# Patient Record
Sex: MALE | Race: WHITE
[De-identification: names, ages, dates, MRNs, and addresses within clinical notes are randomized per-mention and may not be internally consistent; named-entity substitution may affect disease eponyms.]

---

## 2018-09-26 ENCOUNTER — HOSPITAL ENCOUNTER (OUTPATIENT)
Dept: HOSPITAL 72 - SUR | Age: 33
Discharge: HOME | End: 2018-09-26
Payer: COMMERCIAL

## 2018-09-26 VITALS — SYSTOLIC BLOOD PRESSURE: 113 MMHG | DIASTOLIC BLOOD PRESSURE: 73 MMHG

## 2018-09-26 VITALS — SYSTOLIC BLOOD PRESSURE: 120 MMHG | DIASTOLIC BLOOD PRESSURE: 77 MMHG

## 2018-09-26 VITALS — DIASTOLIC BLOOD PRESSURE: 72 MMHG | SYSTOLIC BLOOD PRESSURE: 122 MMHG

## 2018-09-26 VITALS — SYSTOLIC BLOOD PRESSURE: 116 MMHG | DIASTOLIC BLOOD PRESSURE: 73 MMHG

## 2018-09-26 VITALS — SYSTOLIC BLOOD PRESSURE: 114 MMHG | DIASTOLIC BLOOD PRESSURE: 77 MMHG

## 2018-09-26 VITALS — DIASTOLIC BLOOD PRESSURE: 81 MMHG | SYSTOLIC BLOOD PRESSURE: 134 MMHG

## 2018-09-26 VITALS — SYSTOLIC BLOOD PRESSURE: 133 MMHG | DIASTOLIC BLOOD PRESSURE: 80 MMHG

## 2018-09-26 VITALS — SYSTOLIC BLOOD PRESSURE: 117 MMHG | DIASTOLIC BLOOD PRESSURE: 78 MMHG

## 2018-09-26 VITALS — BODY MASS INDEX: 26.6 KG/M2 | WEIGHT: 190 LBS | HEIGHT: 71 IN

## 2018-09-26 VITALS — SYSTOLIC BLOOD PRESSURE: 124 MMHG | DIASTOLIC BLOOD PRESSURE: 56 MMHG

## 2018-09-26 VITALS — SYSTOLIC BLOOD PRESSURE: 112 MMHG | DIASTOLIC BLOOD PRESSURE: 75 MMHG

## 2018-09-26 VITALS — SYSTOLIC BLOOD PRESSURE: 120 MMHG | DIASTOLIC BLOOD PRESSURE: 79 MMHG

## 2018-09-26 VITALS — DIASTOLIC BLOOD PRESSURE: 71 MMHG | SYSTOLIC BLOOD PRESSURE: 123 MMHG

## 2018-09-26 VITALS — SYSTOLIC BLOOD PRESSURE: 120 MMHG | DIASTOLIC BLOOD PRESSURE: 76 MMHG

## 2018-09-26 DIAGNOSIS — K21.9: ICD-10-CM

## 2018-09-26 DIAGNOSIS — I86.1: Primary | ICD-10-CM

## 2018-09-26 PROCEDURE — 94003 VENT MGMT INPAT SUBQ DAY: CPT

## 2018-09-26 PROCEDURE — 94150 VITAL CAPACITY TEST: CPT

## 2018-09-26 PROCEDURE — 55550 LAPARO LIGATE SPERMATIC VEIN: CPT

## 2018-09-26 NOTE — 48 HOUR POST ANESTHESIA EVAL
Post Anesthesia Evaluation


Procedure:  Laparoscopic varicocelectomy


Date of Evaluation:  Sep 26, 2018


Time of Evaluation:  13:20


Blood Pressure Systolic:  124


0:  56


Pulse Rate:  70


Respiratory Rate:  20


Temperature (Fahrenheit):  97.6


O2 Sat by Pulse Oximetry:  99


Airway:  patent


Nausea:  No


Vomiting:  No


Pain Intensity:  2


Hydration Status:  adequate


Cardiopulmonary Status:


stable


Mental Status/LOC:  patient returned to baseline


Follow-up Care/Observations:


n/a


Post-Anesthesia Complications:


none


Follow-up care needed:  ready to discharge











Onofre King MD Sep 26, 2018 15:50

## 2018-09-26 NOTE — ANETHESIA PREOPERATIVE EVAL
Anesthesia Pre-op PMH/ROS


General


Date of Evaluation:  Sep 26, 2018


Time of Evaluation:  10:20


Anesthesiologist:  Christine


ASA Score:  ASA 1


Mallampati Score


Class I : Soft palate, uvula, fauces, pillars visible


Class II: Soft palate, uvula, fauces visible


Class III: Soft palate, base of uvula visible


Class IV: Only hard plate visible


Mallampati Classification:  Class II


Surgeon:  Laparoscopic varicocelectomy


Diagnosis:  Varicocele


Surgical Procedure:  Gersnman


Anesthesia History:  none


Family History:  no anesthesia problems


Allergies:  


Coded Allergies:  


     No Known Allergies (Unverified , 9/25/18)


Medications:  see eMAR





Past Medical History


Cardiovascular:  Denies: HTN, CAD, MI, valve dz, arrhythmia, other


Pulmonary:  Denies: asthma, COPD, SJ, other


Gastrointestinal/Genitourinary:  Reports: GERD; 


   Denies: CRI, ESRD, other


Neurologic/Psychiatric:  Denies: dementia, CVA, depression/anxiety, TIA, other


Endocrine:  Denies: DM, hypothyroidism, steroids, other


HEENT:  Denies: cataract (L), cataract (R), glaucoma, [place] (L), [place] (R), other


Hematology/Immune:  Denies: anemia, DVT, bleeding disorder, other


Musculoskeletal/Integumentary:  Denies: OA, RA, DJD, DDD, edema, other


PMH Narrative:


as above


PSxH Narrative:


partial tonsillectomy urethral lesion removal





Anesthesia Pre-op Phys. Exam


Physician Exam





Last Vital Signs








  Date Time  Temp Pulse Resp B/P (MAP) Pulse Ox O2 Delivery O2 Flow Rate FiO2


 


9/26/18 09:51      Room Air  


 


9/26/18 09:48 97.1 67 18 134/81 (98) 100   





 97.1       








Constitutional:  NAD


Cardiovascular:  RRR, no M/R/G


Respiratory:  CTA


Gastrointestinal:  S/NT/ND





Airway Exam


Mallampati Score:  Class II


MO:  full


Neck:  flexible


ROM:  full


Teeth:  intact


Dentures:  no upper, no lower





Anesthesia Pre-op A/P


Labs


see chart





Studies


Pre-op Studies:  EKG - NSR





Risk Assessment & Plan


Assessment:


ASA 1


Plan:


GA with ETT PONV prevention


Status Change Before Surgery:  No





Pre-Antibiotics


Drug:  Ancef 2gr.


Given Within 1 Hr of Incision:  Yes


Time Given:  11:03











Onofre King MD Sep 26, 2018 11:13

## 2018-09-26 NOTE — BRIEF OPERATIVE NOTE
Immediate Post Operative Note


Operative Note


Pre-op Diagnosis:


left varicocele


Procedure:


left laparoscopic varicocelectomy


Post-op Diagnosis:


same


Surgeon:  terrell Peralta


Anesthesia:  general


Specimen:  none


Complications:  none


Condition:  stable


Fluids:  1000


Estimated Blood Loss:  minimal


Implant(s) used?:  Hiren Hobbs MD Sep 26, 2018 11:37

## 2018-09-26 NOTE — PRE-PROCEDURE NOTE/ATTESTATION
Pre-Procedure Note/Attestation


Complete Prior to Procedure


Planned Procedure:  left


Procedure Narrative:


laparoscopic varicocelectomy left





Indications for Procedure


Pre-Operative Diagnosis:


left varicocele





Attestation


I attest that I discussed the nature of the procedure; its benefits; risks and 

complications; and alternatives (and the risks and benefits of such alternatives

), prior to the procedure, with the patient (or the patient's legal 

representative).





I attest that, if there was a reasonable possibility of needing a blood 

transfusion, the patient (or the patient's legal representative) was given the 

[facility] standardized written summary, pursuant 

to the Arnul[employer] Blood Safety Act ([place] Health and Safety Code # 1645, as 

amended).





I attest that I re-evaluated the patient just prior to the surgery and that 

there has been no change in the patient's H&P, except as documented below:











Hiren Peralta MD Sep 26, 2018 10:15

## 2018-09-26 NOTE — IMMEDIATE POST-OP EVALUATION
Immediate Post-Op Evalulation


Immediate Post-Op Evalulation


Procedure:  Laparoscopic varicocelectomy


Date of Evaluation:  Sep 26, 2018


Time of Evaluation:  11:02


IV Fluids:  1200


Blood Products:  none


Estimated Blood Loss:  min


Urinary Output:  none


Blood Pressure Systolic:  116


Blood Pressure Diastolic:  58


Pulse Rate:  72


Respiratory Rate:  20


O2 Sat by Pulse Oximetry:  99


Temperature (Fahrenheit):  97.6


Pain Score (1-10):  2


Nausea:  No


Vomiting:  No


Complications


none


Patient Status:  reacts, patent, extubated, none


Hydration Status:  adequate











Onofre King MD Sep 26, 2018 15:49

## 2018-09-26 NOTE — OPERATIVE NOTE - DICTATED
DATE OF OPERATION:  09/26/2018



PREOPERATIVE DIAGNOSIS:  Left varicocele.



POSTOPERATIVE DIAGNOSIS:  Left varicocele.



OPERATION:  Laparoscopic left varicocelectomy.



OPERATED BY:  Hiren Peralta M.D.



ANESTHESIA:  General.



FINDINGS:  Left varicocele.



INDICATIONS FOR SURGERY:  The patient had periodic left testicular pain and

some testicular atrophy and stress pattern.  Treatment options were

explained to him in great length including all potential complications.

He understands the nature of the procedure and signed a consent.



DESCRIPTION OF SURGERY:  He was brought to the operating room, placed in

the supine position, prepped and draped in standard fashion under general

anesthesia.  Veress needle was placed.   Pneumoperitoneum was created to

15 mmHg.  After that, three trocars 5 mm were placed at the umbilicus and

both sides closed to the iliac crest.  Pneumoperitoneum was created and

left sigmoid colon was slightly dissected from the gonadal veins.  Veins

were mobilized and peritoneum was opened, clipped, and severed.  There

were two major veins that was creating this varicocele complex, which both

was treated with Endoclips and severed.  There was no evidence of residual

veins.  The sacral artery was carefully preserved as well as vas deferens

with vascular supply.  No evidence of bleeding.  Wound was closed in a

subcuticular fashion with 4-0 Monocryl.  The patient tolerated the

procedure well.  No complications.









  ______________________________________________

  Hiren Peralta M.D.





DR:  PATRICIA

D:  09/26/2018 13:17

T:  09/26/2018 22:07

JOB#:  [id number]

CC: